# Patient Record
Sex: FEMALE | URBAN - METROPOLITAN AREA
[De-identification: names, ages, dates, MRNs, and addresses within clinical notes are randomized per-mention and may not be internally consistent; named-entity substitution may affect disease eponyms.]

---

## 2023-11-29 ENCOUNTER — APPOINTMENT (RX ONLY)
Dept: URBAN - METROPOLITAN AREA CLINIC 56 | Facility: CLINIC | Age: 48
Setting detail: DERMATOLOGY
End: 2023-11-29

## 2023-11-29 DIAGNOSIS — Z41.9 ENCOUNTER FOR PROCEDURE FOR PURPOSES OTHER THAN REMEDYING HEALTH STATE, UNSPECIFIED: ICD-10-CM

## 2023-11-29 PROCEDURE — ? ADDITIONAL NOTES

## 2023-11-29 PROCEDURE — ? COSMETIC CONSULTATION: BOTOX

## 2023-11-29 PROCEDURE — ? BOTOX (U OR CC)

## 2023-11-29 PROCEDURE — ? COSMETIC CONSULTATION: FILLERS

## 2023-11-29 ASSESSMENT — LOCATION SIMPLE DESCRIPTION DERM
LOCATION SIMPLE: GLABELLA
LOCATION SIMPLE: LEFT EYEBROW
LOCATION SIMPLE: SUPERIOR FOREHEAD
LOCATION SIMPLE: LEFT FOREHEAD
LOCATION SIMPLE: INFERIOR FOREHEAD
LOCATION SIMPLE: RIGHT EYEBROW
LOCATION SIMPLE: RIGHT FOREHEAD

## 2023-11-29 ASSESSMENT — LOCATION DETAILED DESCRIPTION DERM
LOCATION DETAILED: INFERIOR MID FOREHEAD
LOCATION DETAILED: SUPERIOR MID FOREHEAD
LOCATION DETAILED: RIGHT CENTRAL EYEBROW
LOCATION DETAILED: GLABELLA
LOCATION DETAILED: RIGHT MEDIAL EYEBROW
LOCATION DETAILED: LEFT CENTRAL EYEBROW
LOCATION DETAILED: LEFT MEDIAL EYEBROW
LOCATION DETAILED: LEFT FOREHEAD
LOCATION DETAILED: RIGHT MEDIAL FOREHEAD
LOCATION DETAILED: LEFT MEDIAL FOREHEAD
LOCATION DETAILED: RIGHT FOREHEAD

## 2023-11-29 ASSESSMENT — LOCATION ZONE DERM: LOCATION ZONE: FACE

## 2023-11-29 NOTE — PROCEDURE: BOTOX (U OR CC)
Lot #: [id number]
Glabellar Complex Units: 22
Additional Area 4 Units: 0
Price (Use Numbers Only, No Special Characters Or $): 297
Detail Level: Detailed
Post-Care Instructions: Patient instructed to not lie down for 4 hours and limit physical activity for 24 hours.
Forehead Units/Cc: 16
Document As Units Or Cc?: units
Including Pricing Information In The Note: No
Additional Area 1 Location: [facility]
Consent: Written consent obtained. Risks include but not limited to lid/brow ptosis, bruising, swelling, diplopia, temporary effect, incomplete chemical denervation.
Expiration Date (Month Year): 2025/11
Dilution (U/0.1 Cc): 1.1
Quantity Per Injection Site (Units Or Cc): 8 U
Quantity Per Injection Site (Units Or Cc): 4 U
Quantity Per Injection Site (Units Or Cc): 3 U
Quantity Per Injection Site (Units Or Cc): 2 U

## 2023-11-29 NOTE — PROCEDURE: ADDITIONAL NOTES
Render Risk Assessment In Note?: no
Detail Level: Detailed
Additional Notes: Recommended \\n-Botox \\nGlabella 22\\nForehead 16\\nTotal units 38=$532\\n\\n\\n-Fillers \\nNLF x2 RHA 4 $800/per syringe

## 2023-12-19 ENCOUNTER — APPOINTMENT (RX ONLY)
Dept: URBAN - METROPOLITAN AREA CLINIC 342 | Facility: CLINIC | Age: 48
Setting detail: DERMATOLOGY
End: 2023-12-19

## 2023-12-19 DIAGNOSIS — Z41.9 ENCOUNTER FOR PROCEDURE FOR PURPOSES OTHER THAN REMEDYING HEALTH STATE, UNSPECIFIED: ICD-10-CM

## 2023-12-19 PROCEDURE — ? TEOSYAL RHA 4 INJECTION

## 2023-12-19 ASSESSMENT — LOCATION DETAILED DESCRIPTION DERM
LOCATION DETAILED: LEFT UPPER CUTANEOUS LIP
LOCATION DETAILED: RIGHT SUPERIOR MEDIAL BUCCAL CHEEK
LOCATION DETAILED: LEFT SUPERIOR MEDIAL BUCCAL CHEEK
LOCATION DETAILED: RIGHT NASOLABIAL FOLD
LOCATION DETAILED: RIGHT INFERIOR MEDIAL MALAR CHEEK

## 2023-12-19 ASSESSMENT — LOCATION SIMPLE DESCRIPTION DERM
LOCATION SIMPLE: RIGHT CHEEK
LOCATION SIMPLE: RIGHT LIP
LOCATION SIMPLE: LEFT LIP
LOCATION SIMPLE: LEFT CHEEK

## 2023-12-19 ASSESSMENT — LOCATION ZONE DERM
LOCATION ZONE: LIP
LOCATION ZONE: FACE

## 2023-12-19 NOTE — PROCEDURE: TEOSYAL RHA 4 INJECTION
Additional Area 3 Volume In Cc: 0
Medical Necessity Clause: This procedure was medically necessary because the lesions that were treated were:
Number Of Syringes (Required For Inventory): 2
Lot #: [id number]
Render Note In Bullet Format When Appropriate: No
Medical Necessity Information: It is in your best interest to select a reason for this procedure from the list below. All of these items fulfill various CMS LCD requirements except the new and changing color options.
Procedural Text: The filler was administered to the treatment areas noted above.
Detail Level: Detailed
Show Spray Paint Technique Variable?: Yes
Anesthesia Volume In Cc: 0.5
Detail Level: Detailed
Map Statment: See Attach Map for Complete Details
Post-Care Instructions: I reviewed with the patient in detail post-care instructions. Patient is to wear sunprotection, and avoid picking at any of the treated lesions. Pt may apply Vaseline to crusted or scabbing areas.
Consent: Written consent obtained. Risks include but not limited to bruising, beading, irregular texture, ulceration, infection, allergic reaction, scar formation, incomplete augmentation, temporary nature, procedural pain.
Spray Paint Text: The liquid nitrogen was applied to the skin utilizing a spray paint frosting technique.
Consent: The patient's consent was obtained including but not limited to risks of crusting, scabbing, blistering, scarring, darker or lighter pigmentary change, recurrence, incomplete removal and infection.
Include Cannula Information In Note?: No
Expiration Date (Month Year): 2024-12-19
Post-Care Instructions: Patient instructed to apply ice to reduce swelling.
Price (Use Numbers Only, No Special Characters Or $): 1600
Filler: Teosyal RHA 4
Anesthesia Type: 1% lidocaine with epinephrine
Additional Anesthesia Volume In Cc: 6

## 2024-01-08 ENCOUNTER — NEW PATIENT (OUTPATIENT)
Dept: URBAN - METROPOLITAN AREA CLINIC 48 | Facility: LOCATION | Age: 49
End: 2024-01-08

## 2024-01-08 DIAGNOSIS — H02.883: ICD-10-CM

## 2024-01-08 DIAGNOSIS — Z01.01: ICD-10-CM

## 2024-01-08 DIAGNOSIS — H02.886: ICD-10-CM

## 2024-01-08 DIAGNOSIS — H52.4: ICD-10-CM

## 2024-01-08 PROCEDURE — 92004 COMPRE OPH EXAM NEW PT 1/>: CPT

## 2024-01-08 PROCEDURE — 92015 DETERMINE REFRACTIVE STATE: CPT

## 2024-01-08 ASSESSMENT — KERATOMETRY
OD_K1POWER_DIOPTERS: 40.75
OD_AXISANGLE2_DEGREES: 90
OS_K2POWER_DIOPTERS: 41.25
OD_K2POWER_DIOPTERS: 41.00
OS_AXISANGLE_DEGREES: 140
OD_AXISANGLE_DEGREES: 180
OS_AXISANGLE2_DEGREES: 50
OS_K1POWER_DIOPTERS: 41.75

## 2024-01-08 ASSESSMENT — TONOMETRY
OD_IOP_MMHG: 16
OS_IOP_MMHG: 16

## 2024-01-08 ASSESSMENT — VISUAL ACUITY
OD_SC: 20/30
OD_CC: 20/20
OS_SC: 20/30
OS_CC: 20/20-2
OU_CC: J1+

## 2024-06-25 ENCOUNTER — APPOINTMENT (RX ONLY)
Dept: URBAN - METROPOLITAN AREA CLINIC 342 | Facility: CLINIC | Age: 49
Setting detail: DERMATOLOGY
End: 2024-06-25

## 2024-06-25 DIAGNOSIS — Z41.9 ENCOUNTER FOR PROCEDURE FOR PURPOSES OTHER THAN REMEDYING HEALTH STATE, UNSPECIFIED: ICD-10-CM

## 2024-06-25 PROCEDURE — ? BOTOX (U OR CC)

## 2024-06-25 ASSESSMENT — LOCATION SIMPLE DESCRIPTION DERM
LOCATION SIMPLE: LEFT EYEBROW
LOCATION SIMPLE: RIGHT FOREHEAD
LOCATION SIMPLE: SUPERIOR FOREHEAD
LOCATION SIMPLE: RIGHT EYEBROW
LOCATION SIMPLE: GLABELLA
LOCATION SIMPLE: INFERIOR FOREHEAD
LOCATION SIMPLE: LEFT FOREHEAD

## 2024-06-25 ASSESSMENT — LOCATION DETAILED DESCRIPTION DERM
LOCATION DETAILED: RIGHT MEDIAL EYEBROW
LOCATION DETAILED: LEFT CENTRAL EYEBROW
LOCATION DETAILED: RIGHT CENTRAL EYEBROW
LOCATION DETAILED: RIGHT FOREHEAD
LOCATION DETAILED: LEFT FOREHEAD
LOCATION DETAILED: LEFT MEDIAL FOREHEAD
LOCATION DETAILED: SUPERIOR MID FOREHEAD
LOCATION DETAILED: RIGHT MEDIAL FOREHEAD
LOCATION DETAILED: LEFT MEDIAL EYEBROW
LOCATION DETAILED: GLABELLA
LOCATION DETAILED: INFERIOR MID FOREHEAD

## 2024-06-25 ASSESSMENT — LOCATION ZONE DERM: LOCATION ZONE: FACE

## 2024-06-25 NOTE — PROCEDURE: BOTOX (U OR CC)
Lot #: [id number]
Glabellar Complex Units: 22
Additional Area 4 Units: 0
Price (Use Numbers Only, No Special Characters Or $): 838
Detail Level: Detailed
Post-Care Instructions: Patient instructed to not lie down for 4 hours and limit physical activity for 24 hours.
Forehead Units/Cc: 16
Document As Units Or Cc?: units
Including Pricing Information In The Note: No
Additional Area 1 Location: [facility]
Consent: Written consent obtained. Risks include but not limited to lid/brow ptosis, bruising, swelling, diplopia, temporary effect, incomplete chemical denervation.
Expiration Date (Month Year): 2025/11
Dilution (U/0.1 Cc): 1.1
Quantity Per Injection Site (Units Or Cc): 8 U
Quantity Per Injection Site (Units Or Cc): 4 U
Quantity Per Injection Site (Units Or Cc): 3 U
Quantity Per Injection Site (Units Or Cc): 2 U

## 2025-07-01 ENCOUNTER — APPOINTMENT (OUTPATIENT)
Dept: URBAN - METROPOLITAN AREA CLINIC 342 | Facility: CLINIC | Age: 50
Setting detail: DERMATOLOGY
End: 2025-07-01

## 2025-07-01 DIAGNOSIS — Z41.9 ENCOUNTER FOR PROCEDURE FOR PURPOSES OTHER THAN REMEDYING HEALTH STATE, UNSPECIFIED: ICD-10-CM

## 2025-07-01 PROCEDURE — ? ADDITIONAL NOTES

## 2025-07-01 PROCEDURE — ? BOTOX (U OR CC)

## 2025-07-01 PROCEDURE — ? COSMETIC CONSULTATION: BOTOX

## 2025-07-01 ASSESSMENT — LOCATION DETAILED DESCRIPTION DERM
LOCATION DETAILED: GLABELLA
LOCATION DETAILED: LEFT MEDIAL FOREHEAD
LOCATION DETAILED: RIGHT INFERIOR TEMPLE
LOCATION DETAILED: LEFT INFERIOR TEMPLE

## 2025-07-01 ASSESSMENT — LOCATION SIMPLE DESCRIPTION DERM
LOCATION SIMPLE: GLABELLA
LOCATION SIMPLE: LEFT TEMPLE
LOCATION SIMPLE: RIGHT TEMPLE
LOCATION SIMPLE: LEFT FOREHEAD

## 2025-07-01 ASSESSMENT — LOCATION ZONE DERM: LOCATION ZONE: FACE

## 2025-07-01 NOTE — PROCEDURE: ADDITIONAL NOTES
Render Risk Assessment In Note?: no
Detail Level: Detailed
Additional Notes: Recommended \\nBotox\\nGlabella 22\\nForehead 16\\nCF 12\\nTotal 50 units\\n$750\\n10% off \\n$675

## 2025-07-01 NOTE — PROCEDURE: BOTOX (U OR CC)
Lateral Platysmal Bands Units: 0
Dilution (U/0.1 Cc): 1
Forehead Units/Cc: 16
Including Pricing Information In The Note: No
Consent: Written consent obtained. Risks include but not limited to lid/brow ptosis, bruising, swelling, diplopia, temporary effect, incomplete chemical denervation.
Glabellar Complex Units: 22
Periorbital Skin Units/Cc: 12
Price (Use Numbers Only, No Special Characters Or $): 844
Additional Area 1 Location: [facility]
Document As Units Or Cc?: units
Post-Care Instructions: Patient instructed to not lie down for 4 hours and limit physical activity for 24 hours.
Additional Area 3 Location: [facility]
Detail Level: Detailed
Additional Area 2 Location: lip flip
Quantity Per Injection Site (Units Or Cc): 2 U
Quantity Per Injection Site (Units Or Cc): 20 U
Quantity Per Injection Site (Units Or Cc): 16 U
Quantity Per Injection Site (Units Or Cc): 6 U